# Patient Record
Sex: FEMALE | Race: WHITE
[De-identification: names, ages, dates, MRNs, and addresses within clinical notes are randomized per-mention and may not be internally consistent; named-entity substitution may affect disease eponyms.]

---

## 2018-03-27 ENCOUNTER — HOSPITAL ENCOUNTER (OUTPATIENT)
Dept: HOSPITAL 74 - FASU | Age: 83
Discharge: HOME | End: 2018-03-27
Payer: COMMERCIAL

## 2018-03-27 VITALS — DIASTOLIC BLOOD PRESSURE: 63 MMHG | SYSTOLIC BLOOD PRESSURE: 128 MMHG | HEART RATE: 72 BPM

## 2018-03-27 VITALS — TEMPERATURE: 98.1 F

## 2018-03-27 VITALS — BODY MASS INDEX: 30.2 KG/M2

## 2018-03-27 DIAGNOSIS — H26.9: Primary | ICD-10-CM

## 2018-03-27 PROCEDURE — 08RJ3JZ REPLACEMENT OF RIGHT LENS WITH SYNTHETIC SUBSTITUTE, PERCUTANEOUS APPROACH: ICD-10-PCS

## 2018-03-27 RX ADMIN — GENTAMICIN SULFATE ONE DROP: 3 SOLUTION/ DROPS OPHTHALMIC at 11:40

## 2018-03-27 RX ADMIN — TROPICAMIDE ONE DROP: 10 SOLUTION/ DROPS OPHTHALMIC at 11:20

## 2018-03-27 RX ADMIN — TROPICAMIDE ONE DROP: 10 SOLUTION/ DROPS OPHTHALMIC at 11:30

## 2018-03-27 RX ADMIN — PHENYLEPHRINE HYDROCHLORIDE ONE DROP: 0.25 SPRAY NASAL at 11:25

## 2018-03-27 RX ADMIN — CYCLOPENTOLATE HYDROCHLORIDE ONE DROP: 10 SOLUTION/ DROPS OPHTHALMIC at 11:30

## 2018-03-27 RX ADMIN — CYCLOPENTOLATE HYDROCHLORIDE ONE DROP: 10 SOLUTION/ DROPS OPHTHALMIC at 11:25

## 2018-03-27 RX ADMIN — PHENYLEPHRINE HYDROCHLORIDE ONE DROP: 0.25 SPRAY NASAL at 11:35

## 2018-03-27 RX ADMIN — PHENYLEPHRINE HYDROCHLORIDE ONE DROP: 0.25 SPRAY NASAL at 11:30

## 2018-03-27 RX ADMIN — PHENYLEPHRINE HYDROCHLORIDE ONE DROP: 0.25 SPRAY NASAL at 11:40

## 2018-03-27 RX ADMIN — KETOROLAC TROMETHAMINE ONE DROP: 5 SOLUTION OPHTHALMIC at 11:25

## 2018-03-27 RX ADMIN — GENTAMICIN SULFATE ONE DROP: 3 SOLUTION/ DROPS OPHTHALMIC at 11:20

## 2018-03-27 RX ADMIN — CYCLOPENTOLATE HYDROCHLORIDE ONE DROP: 10 SOLUTION/ DROPS OPHTHALMIC at 11:35

## 2018-03-27 RX ADMIN — KETOROLAC TROMETHAMINE ONE DROP: 5 SOLUTION OPHTHALMIC at 11:40

## 2018-03-27 RX ADMIN — TROPICAMIDE ONE DROP: 10 SOLUTION/ DROPS OPHTHALMIC at 11:35

## 2018-03-27 RX ADMIN — KETOROLAC TROMETHAMINE ONE DROP: 5 SOLUTION OPHTHALMIC at 11:20

## 2018-03-27 RX ADMIN — CYCLOPENTOLATE HYDROCHLORIDE ONE DROP: 10 SOLUTION/ DROPS OPHTHALMIC at 11:40

## 2018-03-27 RX ADMIN — TROPICAMIDE ONE DROP: 10 SOLUTION/ DROPS OPHTHALMIC at 11:25

## 2018-03-27 RX ADMIN — TROPICAMIDE ONE DROP: 10 SOLUTION/ DROPS OPHTHALMIC at 11:40

## 2018-03-27 RX ADMIN — CYCLOPENTOLATE HYDROCHLORIDE ONE DROP: 10 SOLUTION/ DROPS OPHTHALMIC at 11:20

## 2018-03-27 RX ADMIN — KETOROLAC TROMETHAMINE ONE DROP: 5 SOLUTION OPHTHALMIC at 11:30

## 2018-03-27 RX ADMIN — KETOROLAC TROMETHAMINE ONE DROP: 5 SOLUTION OPHTHALMIC at 11:35

## 2018-03-27 RX ADMIN — PHENYLEPHRINE HYDROCHLORIDE ONE DROP: 0.25 SPRAY NASAL at 11:20

## 2018-03-27 NOTE — OP
DATE OF OPERATION:  03/27/2018

 

TITLE OF PROCEDURE:   Planned extracapsular cataract extraction and

phacoemulsification, insertion of posterior chamber lens implant, right eye.

 

SURGEON:  Alejandro Stewart MD

 

ASSISTANT SURGEON:  Alejandro Stewart MD

 

ANESTHESIA:  Local with standby.

 

ANESTHESIOLOGIST:  Carlos Gallegos MD

 

NURSE ANESTHETIST:  Unknown at this time.

 

PREOPERATIVE DIAGNOSIS:  Cataract, right eye.

 

POSTOPERATIVE DIAGNOSIS:  Cataract, right eye.

 

FINDINGS AND PROCEDURE:  After successful peribulbar anesthesia was given to the

patient, the patient was prepped and draped in the usual manner to expose the right

eye.  Tegaderm strips were placed on the lashes.  The microscope brought into

position over the right eye.  A superior fornix-based flap was then fashioned for 12

mm using Skip scissor and 0.12 forceps, and hemostasis achieved by wet field

cautery.  Limbal groove was then fashioned for 3 mm with a crescent blade and

dissecting anterior into clear cornea, and then, a3-mm blade was used to enter the

anterior chamber, and _____ Viscoat.  A 360-degree anterior capsulotomy was performed

and the leaflet removed from the eye and phacoemulsification of the entire nucleus

was done in bimanual technique in approximately 1-minute time, followed by irrigation

and aspiration of all cortical material, leaving intact posterior capsule and a red

reflex present.  Provisc was injected in the posterior chamber to deepen the

posterior capsule.  Then, the implant was inspected and found to be free of defect,

debris, and flaws.  It was folded, placed in the Provisc-filled cartridge.  The

cartridge was placed in the injector, and the implant was injected into the eye, such

that the inferior haptic was placed in the inferior capsular bag and the superior

haptic in the superior capsular bag and then rotated into the horizontal position

with a Sinskey hook.  The Provisc was aspirated out, replaced with Miochol and

Miostat and BSS.  The wound was closed with a single interrupted 2-0 Ethilon suture,

and the conjunctival-tenon flap reapproximated.  At this point, the implant was

fixated in the capsular bag, centrally located with a round pupil, intact posterior

capsule, and a red reflex present.  Topical Betoptic S and Maxitrol ophthalmic

suspensions were placed as was bacitracin and polymyxin B ophthalmic ointment, and

then, the Tegaderm strips were removed from the lids, as was the speculum.  The lids

were closed, and a patch and shield placed on the eye.  The patient was then

discharged from the operating room to the recovery area in good condition, having

tolerated the procedure well.

 

 

ALEJANDRO STEWART M.D.

 

[id number]

DD: 03/27/2018 13:24

DT: 03/27/2018 14:17

Job #:  [id number]